# Patient Record
Sex: MALE | Race: WHITE | NOT HISPANIC OR LATINO | URBAN - NONMETROPOLITAN AREA
[De-identification: names, ages, dates, MRNs, and addresses within clinical notes are randomized per-mention and may not be internally consistent; named-entity substitution may affect disease eponyms.]

---

## 2018-03-08 PROBLEM — H52.223: Noted: 2018-03-08

## 2018-03-08 PROBLEM — E11.9: Noted: 2018-03-08

## 2018-03-08 PROBLEM — H52.13: Noted: 2018-03-08

## 2019-09-17 ENCOUNTER — IMPORTED ENCOUNTER (OUTPATIENT)
Dept: URBAN - NONMETROPOLITAN AREA CLINIC 1 | Facility: CLINIC | Age: 39
End: 2019-09-17

## 2019-09-17 PROCEDURE — 92014 COMPRE OPH EXAM EST PT 1/>: CPT

## 2019-09-17 PROCEDURE — 92310 CONTACT LENS FITTING OU: CPT

## 2019-09-17 PROCEDURE — 92015 DETERMINE REFRACTIVE STATE: CPT

## 2019-09-17 NOTE — PATIENT DISCUSSION
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 Does not check BS at home.  -  reviewed the importance of good blood sugar control and the negative effects of poorly controlled sugars on ocular health-  will send notes from today to Cari Broussard PA-C -  monitor yearly or prn; 's Notes: MR 9/17/2019DFE 9/17/2019

## 2020-09-18 ENCOUNTER — IMPORTED ENCOUNTER (OUTPATIENT)
Dept: URBAN - NONMETROPOLITAN AREA CLINIC 1 | Facility: CLINIC | Age: 40
End: 2020-09-18

## 2020-09-18 PROCEDURE — 92310 CONTACT LENS FITTING OU: CPT

## 2020-09-18 PROCEDURE — 92014 COMPRE OPH EXAM EST PT 1/>: CPT

## 2020-09-18 PROCEDURE — 92015 DETERMINE REFRACTIVE STATE: CPT

## 2020-09-18 NOTE — PATIENT DISCUSSION
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 Does not check BS at home.  -  reviewed the importance of good blood sugar control and the negative effects of poorly controlled sugars on ocular health-  will send notes from today to Maximino Ruiz PA-C -  monitor yearly or prn; 's Notes:  9/18/2020DFE 9/18/2020

## 2021-09-20 ENCOUNTER — IMPORTED ENCOUNTER (OUTPATIENT)
Dept: URBAN - NONMETROPOLITAN AREA CLINIC 1 | Facility: CLINIC | Age: 41
End: 2021-09-20

## 2021-09-20 PROCEDURE — 92015 DETERMINE REFRACTIVE STATE: CPT

## 2021-09-20 PROCEDURE — V2521 CNTCT LENS HYDROPHILIC TORIC: HCPCS

## 2021-09-20 PROCEDURE — V2520 CONTACT LENS HYDROPHILIC: HCPCS

## 2021-09-20 PROCEDURE — 92014 COMPRE OPH EXAM EST PT 1/>: CPT

## 2021-09-20 PROCEDURE — 92310 CONTACT LENS FITTING OU: CPT

## 2021-09-20 NOTE — PATIENT DISCUSSION
Compound Myopic Astigmatism OU -  discussed findings w/patient-  new spectacle/CL Rx issued-  monitor yearly or prn Type II DM w/o Retinopathy (Dx 2016)-  discussed findings w/patient-  condition controlled with medication -  no retinopathy noted at this time-  reviewed the importance of good blood sugar control and the negative effects of poorly controlled sugars on ocular health-  sent notes from today to Yan Shaikh PA-C -  monitor yearly or prn; 's Notes: MR 9/20/2021DFE 9/20/2021

## 2022-04-10 ASSESSMENT — VISUAL ACUITY
OD_SC: 20/20
OU_SC: 20/20
OS_SC: 20/40
OU_SC: 20/20
OD_SC: 20/20
OD_SC: 20/20
OS_SC: 20/30-2
OU_CC: 20/20
OS_SC: 20/30
OS_SC: 20/30

## 2022-04-10 ASSESSMENT — TONOMETRY
OS_IOP_MMHG: 18
OD_IOP_MMHG: 14
OS_IOP_MMHG: 15
OD_IOP_MMHG: 19
OD_IOP_MMHG: 14
OS_IOP_MMHG: 13